# Patient Record
Sex: FEMALE | Race: WHITE | NOT HISPANIC OR LATINO | Employment: UNEMPLOYED | URBAN - METROPOLITAN AREA
[De-identification: names, ages, dates, MRNs, and addresses within clinical notes are randomized per-mention and may not be internally consistent; named-entity substitution may affect disease eponyms.]

---

## 2018-01-04 ENCOUNTER — APPOINTMENT (OUTPATIENT)
Dept: RADIOLOGY | Facility: IMAGING CENTER | Age: 58
End: 2018-01-04
Attending: PHYSICIAN ASSISTANT
Payer: COMMERCIAL

## 2018-01-04 ENCOUNTER — OFFICE VISIT (OUTPATIENT)
Dept: URGENT CARE | Facility: CLINIC | Age: 58
End: 2018-01-04
Payer: COMMERCIAL

## 2018-01-04 VITALS
TEMPERATURE: 98.3 F | RESPIRATION RATE: 16 BRPM | SYSTOLIC BLOOD PRESSURE: 110 MMHG | BODY MASS INDEX: 21.44 KG/M2 | HEIGHT: 66 IN | WEIGHT: 133.4 LBS | OXYGEN SATURATION: 97 % | DIASTOLIC BLOOD PRESSURE: 60 MMHG | HEART RATE: 72 BPM

## 2018-01-04 DIAGNOSIS — J10.1 INFLUENZA B: Primary | ICD-10-CM

## 2018-01-04 DIAGNOSIS — J06.9 URI WITH COUGH AND CONGESTION: ICD-10-CM

## 2018-01-04 LAB
FLUAV+FLUBV AG SPEC QL IA: NORMAL
INT CON NEG: NEGATIVE
INT CON POS: POSITIVE

## 2018-01-04 PROCEDURE — 87804 INFLUENZA ASSAY W/OPTIC: CPT | Performed by: PHYSICIAN ASSISTANT

## 2018-01-04 PROCEDURE — 71046 X-RAY EXAM CHEST 2 VIEWS: CPT | Mod: 26 | Performed by: PHYSICIAN ASSISTANT

## 2018-01-04 PROCEDURE — 99204 OFFICE O/P NEW MOD 45 MIN: CPT | Performed by: PHYSICIAN ASSISTANT

## 2018-01-04 RX ORDER — OSELTAMIVIR PHOSPHATE 75 MG/1
75 CAPSULE ORAL 2 TIMES DAILY
Qty: 10 CAP | Refills: 0 | Status: SHIPPED | OUTPATIENT
Start: 2018-01-04 | End: 2018-01-04 | Stop reason: SDUPTHER

## 2018-01-04 RX ORDER — METHYLPREDNISOLONE 4 MG/1
TABLET ORAL
Qty: 21 TAB | Refills: 0 | Status: SHIPPED | OUTPATIENT
Start: 2018-01-04 | End: 2019-02-04 | Stop reason: SDUPTHER

## 2018-01-04 RX ORDER — OSELTAMIVIR PHOSPHATE 75 MG/1
75 CAPSULE ORAL 2 TIMES DAILY
Qty: 10 CAP | Refills: 0 | Status: SHIPPED | OUTPATIENT
Start: 2018-01-04 | End: 2018-01-09

## 2018-01-04 RX ORDER — PROMETHAZINE HYDROCHLORIDE AND CODEINE PHOSPHATE 6.25; 1 MG/5ML; MG/5ML
5 SYRUP ORAL 4 TIMES DAILY PRN
Qty: 240 ML | Refills: 0 | Status: SHIPPED | OUTPATIENT
Start: 2018-01-04 | End: 2018-01-18

## 2018-01-04 NOTE — PATIENT INSTRUCTIONS
"Influenza Facts  Flu (influenza) is a contagious respiratory illness caused by the influenza viruses. It can cause mild to severe illness. While most healthy people recover from the flu without specific treatment and without complications, older people, young children, and people with certain health conditions are at higher risk for serious complications from the flu, including death.  CAUSES   · The flu virus is spread from person to person by respiratory droplets from coughing and sneezing.   · A person can also become infected by touching an object or surface with a virus on it and then touching their mouth, eye or nose.   · Adults may be able to infect others from 1 day before symptoms occur and up to 7 days after getting sick. So it is possible to give someone the flu even before you know you are sick and continue to infect others while you are sick.   SYMPTOMS   · Fever (usually high).   · Headache.   · Tiredness (can be extreme).   · Cough.   · Sore throat.   · Runny or stuffy nose.   · Body aches.   · Diarrhea and vomiting may also occur, particularly in children.   · These symptoms are referred to as \"flu-like symptoms\". A lot of different illnesses, including the common cold, can have similar symptoms.   DIAGNOSIS   · There are tests that can determine if you have the flu as long you are tested within the first 2 or 3 days of illness.   · A doctor's exam and additional tests may be needed to identify if you have a disease that is a complicating the flu.   RISKS AND COMPLICATIONS   Some of the complications caused by the flu include:  · Bacterial pneumonia or progressive pneumonia caused by the flu virus.   · Loss of body fluids (dehydration).   · Worsening of chronic medical conditions, such as heart failure, asthma, or diabetes.   · Sinus problems and ear infections.   HOME CARE INSTRUCTIONS   · Seek medical care early on.   · If you are at high risk from complications of the flu, consult your health-care " provider as soon as you develop flu-like symptoms. Those at high risk for complications include:   · People 65 years or older.   · People with chronic medical conditions, including diabetes.   · Pregnant women.   · Young children.   · Your caregiver may recommend use of an antiviral medication to help treat the flu.   · If you get the flu, get plenty of rest, drink a lot of liquids, and avoid using alcohol and tobacco.   · You can take over-the-counter medications to relieve the symptoms of the flu if your caregiver approves. (Never give aspirin to children or teenagers who have flu-like symptoms, particularly fever).   PREVENTION   The single best way to prevent the flu is to get a flu vaccine each fall. Other measures that can help protect against the flu are:  · Antiviral Medications   · A number of antiviral drugs are approved for use in preventing the flu. These are prescription medications, and a doctor should be consulted before they are used.   · Habits for Good Health   · Cover your nose and mouth with a tissue when you cough or sneeze, throw the tissue away after you use it.   · Wash your hands often with soap and water, especially after you cough or sneeze. If you are not near water, use an alcohol-based hand .   · Avoid people who are sick.   · If you get the flu, stay home from work or school. Avoid contact with other people so that you do not make them sick, too.   · Try not to touch your eyes, nose, or mouth as germs ore often spread this way.   IN CHILDREN, EMERGENCY WARNING SIGNS THAT NEED URGENT MEDICAL ATTENTION:  · Fast breathing or trouble breathing.   · Bluish skin color.   · Not drinking enough fluids.   · Not waking up or not interacting.   · Being so irritable that the child does not want to be held.   · Flu-like symptoms improve but then return with fever and worse cough.   · Fever with a rash.   IN ADULTS, EMERGENCY WARNING SIGNS THAT NEED URGENT MEDICAL ATTENTION:  · Difficulty  breathing or shortness of breath.   · Pain or pressure in the chest or abdomen.   · Sudden dizziness.   · Confusion.   · Severe or persistent vomiting.   SEEK IMMEDIATE MEDICAL CARE IF:   You or someone you know is experiencing any of the symptoms above. When you arrive at the emergency center,report that you think you have the flu. You may be asked to wear a mask and/or sit in a secluded area to protect others from getting sick.  MAKE SURE YOU:   · Understand these instructions.   · Monitor your condition.   · Seek medical care if you are getting worse, or not improving.   Document Released: 12/20/2004 Document Revised: 03/11/2013 Document Reviewed: 09/16/2010  [employer]® Patient Information ©2013 [employer], [employer].

## 2018-01-04 NOTE — PROGRESS NOTES
Subjective:      Pt is a 57 y.o. female who presents with Fever (x 5 days, cough, HA, chills, worse at night )            HPI  PT presents to  clinic today complaining of sore throat, fevers, chills, watery eyes, pressure in ears, cough, fatigue, runny nose. PT denies CP, SOB, NVD, abdominal pain, joint pain. PT states these symptoms began around 5 days ago. PT states the pain is a 6/10 with coughing, aching in nature and worse at night.  Pt has not taken any RX medications for this condition. The pt's medication list, problem list, and allergies have been evaluated and reviewed during today's visit.      PMH:  Negative per pt.      PSH:  Negative per pt.      Fam Hx:  the patient's family history is not pertinent to their current complaint    Soc HX:  Social History     Social History   • Marital status:      Spouse name: N/A   • Number of children: N/A   • Years of education: N/A     Occupational History   • Not on file.     Social History Main Topics   • Smoking status: Never Smoker   • Smokeless tobacco: Never Used   • Alcohol use Yes      Comment: once in a while    • Drug use: No   • Sexual activity: Not on file     Other Topics Concern   • Not on file     Social History Narrative   • No narrative on file         Medications:    Current Outpatient Prescriptions:   •  oseltamivir (TAMIFLU) 75 MG Cap, Take 1 Cap by mouth 2 times a day for 5 days., Disp: 10 Cap, Rfl: 0  •  promethazine-codeine (PHENERGAN-CODEINE) 6.25-10 MG/5ML Syrup, Take 5 mL by mouth 4 times a day as needed for up to 14 days., Disp: 240 mL, Rfl: 0  •  MethylPREDNISolone (MEDROL DOSEPAK) 4 MG Tablet Therapy Pack, Use as directed, Disp: 21 Tab, Rfl: 0      Allergies:  Patient has no known allergies.    ROS  Constitutional: Positive for chills, fevers, and malaise/fatigue.   HENT: Positive for congestion and sore throat. Negative for ear pain.    Eyes: Negative for blurred vision, double vision and photophobia.   Respiratory: Positive  "for cough and sputum production. Negative for hemoptysis, shortness of breath and wheezing.    Cardiovascular: Negative for chest pain and palpitations.   Gastrointestinal: Negative for nausea, vomiting, abdominal pain, diarrhea and constipation.   Genitourinary: Negative for dysuria and flank pain.   Musculoskeletal: Negative for falls and myalgias.   Skin: Negative for itching and rash.   Neurological:  Negative for dizziness and tingling.   Endo/Heme/Allergies: Does not bruise/bleed easily.   Psychiatric/Behavioral: Negative for depression. The patient is not nervous/anxious.             Objective:     /60   Pulse 72   Temp 36.8 °C (98.3 °F)   Resp 16   Ht 1.676 m (5' 6\")   Wt 60.5 kg (133 lb 6.4 oz)   SpO2 97%   Breastfeeding? No   BMI 21.53 kg/m²      Physical Exam       Constitutional: PT is oriented to person, place, and time. PT appears well-developed and well-nourished. No distress.   HENT:   Head: Normocephalic and atraumatic.   Right Ear: Hearing, tympanic membrane, external ear and ear canal normal.   Left Ear: Hearing, tympanic membrane, external ear and ear canal normal.   Nose: Mucosal edema, rhinorrhea and sinus tenderness present. Right sinus exhibits frontal sinus tenderness. Left sinus exhibits frontal sinus tenderness.   Mouth/Throat: Uvula is midline. Mucous membranes are pale. Posterior oropharyngeal edema and posterior oropharyngeal erythema present. No oropharyngeal exudate.   Eyes: Conjunctivae normal and EOM are normal. Pupils are equal, round, and reactive to light.   Neck: Normal range of motion. Neck supple. No thyromegaly present.   Cardiovascular: Normal rate, regular rhythm, normal heart sounds and intact distal pulses.  Exam reveals no gallop and no friction rub.    No murmur heard.  Pulmonary/Chest: Effort normal and breath sounds normal. No respiratory distress. PT has no wheezes. PT has no rales. PT exhibits no tenderness.   Abdominal: Soft. Bowel sounds are normal. " PT exhibits no distension and no mass. There is no tenderness. There is no rebound and no guarding.   Musculoskeletal: Normal range of motion. PT exhibits no edema and no tenderness.   Lymphadenopathy:     PT has no cervical adenopathy.   Neurological: PT is alert and oriented to person, place, and time. PT displays normal reflexes. No cranial nerve deficit. PT exhibits normal muscle tone. Coordination normal.   Skin: Skin is warm and dry. No rash noted. No erythema.   Psychiatric: PT has a normal mood and affect. PT behavior is normal. Judgment and thought content normal.     RADS:  Narrative     1/4/2018 12:51 PM    HISTORY/REASON FOR EXAM:  Shortness of breath    TECHNIQUE/EXAM DESCRIPTION:  PA and lateral views of the chest.    COMPARISON:  None    FINDINGS:    The heart is not enlarged. No focal consolidation, pleural effusion or pneumothorax is identified.  Costophrenic angles are sharp. Degenerative changes are seen in the spine.   Impression     No acute cardiopulmonary process is identified.      Reading Provider Reading Date   Denver Schuster M.D. Jan 4, 2018      Signing Provider Signing Date Signing Time   Denver Schuster M.D. Jan 4, 2018  1:04 PM          Assessment/Plan:     1. Influenza B    - oseltamivir (TAMIFLU) 75 MG Cap; Take 1 Cap by mouth 2 times a day for 5 days.  Dispense: 10 Cap; Refill: 0  - MethylPREDNISolone (MEDROL DOSEPAK) 4 MG Tablet Therapy Pack; Use as directed  Dispense: 21 Tab; Refill: 0    2. URI with cough and congestion    - DX-CHEST-2 VIEWS; Future  - POCT Influenza A/B-->POS B  - promethazine-codeine (PHENERGAN-CODEINE) 6.25-10 MG/5ML Syrup; Take 5 mL by mouth 4 times a day as needed for up to 14 days.  Dispense: 240 mL; Refill: 0  - MethylPREDNISolone (MEDROL DOSEPAK) 4 MG Tablet Therapy Pack; Use as directed  Dispense: 21 Tab; Refill: 0    Rest, fluids encouraged.  OTC decongestant for congestion/cough  AVS with medical info given.  Pt was in full understanding and  agreement with the plan.  Follow-up as needed if symptoms worsen or fail to improve.

## 2018-05-04 ENCOUNTER — TELEPHONE (OUTPATIENT)
Dept: MEDICAL GROUP | Facility: LAB | Age: 58
End: 2018-05-04

## 2018-05-04 NOTE — TELEPHONE ENCOUNTER
Phone Number Called: [phone] (home)     Message: I left a voicemail that Dr. Jorge will be on personal leave in May, we rescheduled your appointment from 5/16 to Wednesday 6/20 at 9am. Please contact us to reschedule your appointment ([phone] [facility] Scheduling or [employer]) with Dr. Jorge or another provider in the office. We are sorry for the short notice and any inconvenience. Thank you!    Left Message for patient to call back: yes

## 2018-06-20 ENCOUNTER — OFFICE VISIT (OUTPATIENT)
Dept: MEDICAL GROUP | Facility: LAB | Age: 58
End: 2018-06-20
Payer: COMMERCIAL

## 2018-06-20 VITALS
OXYGEN SATURATION: 96 % | HEIGHT: 66 IN | SYSTOLIC BLOOD PRESSURE: 114 MMHG | RESPIRATION RATE: 12 BRPM | BODY MASS INDEX: 22.25 KG/M2 | HEART RATE: 60 BPM | TEMPERATURE: 98.5 F | DIASTOLIC BLOOD PRESSURE: 68 MMHG | WEIGHT: 138.45 LBS

## 2018-06-20 DIAGNOSIS — Z12.11 COLON CANCER SCREENING: ICD-10-CM

## 2018-06-20 DIAGNOSIS — Z78.0 MENOPAUSE: ICD-10-CM

## 2018-06-20 DIAGNOSIS — Z23 NEED FOR VACCINATION: ICD-10-CM

## 2018-06-20 DIAGNOSIS — Z85.828 HISTORY OF BASAL CELL CANCER: ICD-10-CM

## 2018-06-20 DIAGNOSIS — Z00.00 ANNUAL PHYSICAL EXAM: ICD-10-CM

## 2018-06-20 PROCEDURE — 99203 OFFICE O/P NEW LOW 30 MIN: CPT | Performed by: INTERNAL MEDICINE

## 2018-06-20 ASSESSMENT — PATIENT HEALTH QUESTIONNAIRE - PHQ9: CLINICAL INTERPRETATION OF PHQ2 SCORE: 0

## 2018-06-20 ASSESSMENT — PAIN SCALES - GENERAL: PAINLEVEL: NO PAIN

## 2018-06-20 NOTE — PROGRESS NOTES
Chief Complaint   Patient presents with   • Referral Needed     dermatology, establish care        Subjective:     History of Present Illness: Patient is a 58 y.o. female. This pleasant patient is here today to establish care with a new primary care provider and address medical problems listed below.    Annual physical exam  This is a pleasant 58-year-old lady who is here today to establish care with a new primary care provider.  She is fairly healthy, she tries to eat healthy and she exercises on a regular basis.  She is a never smoker.  We discussed about her health maintenance as below.  She normally sees her gynecologist Dr. Gaines on a regular basis for her woman's health exam as well as Pap smears.  Mammograms: Scheduled for next week. Last was couple of years, usually normal.  PAP smears: 2 wks ago by Dr. Gaines  Colon cancer screening: Colonoscopy done, 5 yrs interval  Osteoporosis screening: Ordered DEXA scan, average risk  Immunizations: Tdap today    History of basal cell cancer  New to me, chronic controlled.  She stated that she has a history of a basal cell carcinoma that was resected from her nose area in 2015.  Stayed in remission.  She had a history of moderate sun exposure as she grew up in [facility] without use of sunscreen.  Her sister was recently diagnosed with malignant melanoma and got resected from her leg area.  She is currently in remission.  She is quite concerned that she would like to establish with a dermatologist for annual skin surveillance.      Allergies: Patient has no known allergies.    Current Outpatient Prescriptions Ordered in [facility]   Medication Sig Dispense Refill   • MethylPREDNISolone (MEDROL DOSEPAK) 4 MG Tablet Therapy Pack Use as directed 21 Tab 0     No current Epic-ordered facility-administered medications on file.        Past Medical History:   Diagnosis Date   • History of basal cell cancer 6/20/2018    S/P resection from nose 2015 H/O Moderate sun  "exposure FHx of melanoma       Past Surgical History:   Procedure Laterality Date   • BASAL CELL EXCISION         Social History   Substance Use Topics   • Smoking status: Never Smoker   • Smokeless tobacco: Never Used   • Alcohol use Yes      Comment: once in a while        Family History   Problem Relation Age of Onset   • Other Mother      Primary billiary chirosis    • Cancer Father      AML   • Cancer Sister      Malignant Melanoma    • No Known Problems Brother        ROS:     - Constitutional: Negative for fever, chills, unexpected weight change, and fatigue/generalized weakness.     - HEENT: Negative for headaches, vision changes, hearing changes, ear pain, ear discharge, sinus congestion, sore throat, and neck pain.      - Respiratory: Negative for cough, sputum production, dyspnea and wheezing.    - Cardiovascular: Negative for chest pain, palpitations, orthopnea, and bilateral lower extremity edema.     - Gastrointestinal: Negative for heartburn, nausea, vomiting, abdominal pain, hematochezia, melena, diarrhea, constipation, and greasy/foul-smelling stools.     - Genitourinary: Negative for dysuria, polyuria, hematuria, pyuria, urinary urgency, and urinary incontinence.    - Musculoskeletal: Negative for myalgias, back pain, and joint pain.     - Skin: Negative for rash, itching, cyanotic skin color change.     - Neurological: Negative for dizziness, tingling, tremors, focal sensory deficit, focal weakness and headaches.     - Endo/Heme/Allergies: Does not bruise/bleed easily.     - Psychiatric/Behavioral: Negative for depression, suicidal/homicidal ideation and memory loss.        - NOTE: All other systems reviewed and are negative, except as in HPI.       Physical Exam:     Blood pressure 114/68, pulse 60, temperature 36.9 °C (98.5 °F), resp. rate 12, height 1.664 m (5' 5.5\"), weight 62.8 kg (138 lb 7.2 oz), SpO2 96 %, not currently breastfeeding. Body mass index is 22.69 kg/m².   General: Normal " appearing. No distress.  HEENT: Normocephalic. Eyes conjunctiva clear lids without ptosis, pupils equal and reactive to light accommodation, ears normal shape and contour, canals are clear bilaterally, tympanic membranes are benign,  oropharynx is without erythema, edema or exudates.    Neck: Supple without JVD or bruit. Thyroid is not enlarged.  Pulmonary: Clear to ausculation.  Normal effort. No rales, ronchi, or wheezing.  Cardiovascular: Regular rate and rhythm without murmur. Radial pulses are intact, regular and symmetrical bilaterally.  Abdomen: Soft, nontender, nondistended. Normal bowel sounds. Liver and spleen are not palpable.  Neurologic: Grossly non-focal.  Lymph: No cervical, occipital or supraclavicular lymph nodes are palpable  Skin: Warm and dry.  No obvious lesions.  Musculoskeletal: Normal gait. No extremity cyanosis, clubbing, or edema.  Psych: Normal mood and affect. Alert and oriented x3. Judgment and insight is normal.    Data:     Requested outside records for Pap smear and colonoscopy.    Assessment and Plan:     1. History of basal cell cancer  New to me, chronic controlled.  No new suspicious lesions. Referred to dermatology for regular skin surveillance.   - REFERRAL TO DERMATOLOGY    2. Colon cancer screening  Due for repeat, will obtain old records. Proceed with referral.   - REFERRAL TO GI FOR COLONOSCOPY      3. Annual physical exam  As discussed in HPI, she is up-to-date on her health maintenance otherwise.  We need to update her immunizations for next visit.  We will proceed with the following labs for screening purposes well.  - HEMOGLOBIN A1C; Future  - LIPID PROFILE; Future  - VITAMIN D,25 HYDROXY; Future  - TSH; Future  - COMP METABOLIC PANEL; Future  - CBC WITHOUT DIFFERENTIAL; Future    4. Need for vaccination  She needs hepatitis A, B and TD vaccines.  And available in the office today, will be given next time.  - HEPATITIS B VACCINE ADULT IM  - HEPATITIS A VACCINE ADULT  IM    5. Menopause  This patient is average risk for osteoporosis, will proceed with a baseline bone density scan.  - DS-BONE DENSITY STUDY (DEXA); Future      Health Maintenance:      Completed  Health Maintenance Due   Topic   • IMM DTaP/Tdap/Td Vaccine (1 - Tdap)   • PAP SMEAR    • MAMMOGRAM    • COLONOSCOPY        Follow Up:      Return in about 2 months (around 8/20/2018) for FU labs and DEXA, more immunizations.    Please note that this dictation was created using voice recognition software. I have made every reasonable attempt to correct obvious errors, but I expect that there are errors of grammar and possibly content that I did not discover before finalizing the note.    Signed by: Izzy Jorge M.D.

## 2018-06-20 NOTE — ASSESSMENT & PLAN NOTE
New to me, chronic controlled.  She stated that she has a history of a basal cell carcinoma that was resected from her nose area in 2015.  Stayed in remission.  She had a history of moderate sun exposure as she grew up in [facility] without use of sunscreen.  Her sister was recently diagnosed with malignant melanoma and got resected from her leg area.  She is currently in remission.  She is quite concerned that she would like to establish with a dermatologist for annual skin surveillance.

## 2018-06-20 NOTE — ASSESSMENT & PLAN NOTE
This is a pleasant 58-year-old lady who is here today to establish care with a new primary care provider.  She is fairly healthy, she tries to eat healthy and she exercises on a regular basis.  She is a never smoker.  We discussed about her health maintenance as below.  She normally sees her gynecologist Dr. Gaines on a regular basis for her woman's health exam as well as Pap smears.  Mammograms: Scheduled for next week. Last was couple of years, usually normal.  PAP smears: 2 wks ago by Dr. Gaines  Colon cancer screening: Colonoscopy done, 5 yrs interval  Osteoporosis screening: Ordered DEXA scan, average risk  Immunizations: Tdap today

## 2018-06-22 DIAGNOSIS — Z12.11 COLON CANCER SCREENING: ICD-10-CM

## 2018-06-27 ENCOUNTER — OFFICE VISIT (OUTPATIENT)
Dept: DERMATOLOGY | Facility: IMAGING CENTER | Age: 58
End: 2018-06-27
Payer: COMMERCIAL

## 2018-06-27 VITALS — TEMPERATURE: 97.7 F | WEIGHT: 136 LBS | HEIGHT: 66 IN | BODY MASS INDEX: 21.86 KG/M2

## 2018-06-27 DIAGNOSIS — L57.0 ACTINIC KERATOSES: ICD-10-CM

## 2018-06-27 DIAGNOSIS — D23.9 ANGIOKERATOMA: ICD-10-CM

## 2018-06-27 DIAGNOSIS — L30.9 ECZEMA, UNSPECIFIED TYPE: ICD-10-CM

## 2018-06-27 DIAGNOSIS — Z85.828 HISTORY OF BASAL CELL CARCINOMA: ICD-10-CM

## 2018-06-27 DIAGNOSIS — L73.8 SEBACEOUS HYPERPLASIA: ICD-10-CM

## 2018-06-27 DIAGNOSIS — D23.9 DERMATOFIBROMA: ICD-10-CM

## 2018-06-27 PROCEDURE — 17003 DESTRUCT PREMALG LES 2-14: CPT | Performed by: DERMATOLOGY

## 2018-06-27 PROCEDURE — 17000 DESTRUCT PREMALG LESION: CPT | Performed by: DERMATOLOGY

## 2018-06-27 PROCEDURE — 99203 OFFICE O/P NEW LOW 30 MIN: CPT | Mod: 25 | Performed by: DERMATOLOGY

## 2018-06-27 RX ORDER — TRIAMCINOLONE ACETONIDE 1 MG/G
1 CREAM TOPICAL 2 TIMES DAILY
Qty: 30 G | Refills: 1 | Status: SHIPPED | OUTPATIENT
Start: 2018-06-27 | End: 2019-02-04 | Stop reason: SDUPTHER

## 2018-06-27 ASSESSMENT — ENCOUNTER SYMPTOMS
CHILLS: 0
FEVER: 0

## 2018-06-27 NOTE — PROGRESS NOTES
Dermatology New Patient Visit    Chief Complaint   Patient presents with   • Establish Care       Subjective:     HPI:   Yesenia Dey is a 58 y.o. female presenting for    Skin cancer screening today   Last exam 5 years ago   Area of concern : lesions on right hand x 5 years on and off  No recent change in size  No itching/bleeding/pain  No treatments  Flakes off intermittently     Brown spots on the arms  Increasing in number   No recent change in size  No itching/bleeding/pain  No treatments    Dry skin behind the ears  Bothersome intermittently   Itchy  Uses OTC moisturizing cream, no improvement     History of skin cancer: Yes, Details:  Basal on tip of nose 10/2016   History of biopsies:No  History of blistering/severe sunburns:Yes, Details:  During youth  Family history of skin cancer:Yes, Details: sister, melanoma   Family history of atypical moles:No        Past Medical History:   Diagnosis Date   • History of basal cell cancer 6/20/2018    S/P resection from nose 2015 H/O Moderate sun exposure FHx of melanoma       Current Outpatient Prescriptions on File Prior to Visit   Medication Sig Dispense Refill   • MethylPREDNISolone (MEDROL DOSEPAK) 4 MG Tablet Therapy Pack Use as directed 21 Tab 0     No current facility-administered medications on file prior to visit.        No Known Allergies    Family History   Problem Relation Age of Onset   • Other Mother      Primary billiary chirosis    • Cancer Father      AML   • Cancer Sister      Malignant Melanoma    • No Known Problems Brother        Social History     Social History   • Marital status:      Spouse name: N/A   • Number of children: N/A   • Years of education: N/A     Occupational History   • Not on file.     Social History Main Topics   • Smoking status: Never Smoker   • Smokeless tobacco: Never Used   • Alcohol use Yes      Comment: once in a while    • Drug use: No   • Sexual activity: Yes     Partners: Male     Other Topics Concern   •  "Not on file     Social History Narrative   • No narrative on file       Review of Systems   Constitutional: Negative for chills and fever.   Skin: Positive for itching and rash.   All other systems reviewed and are negative.       Objective:     A full mucocutaneous exam was completed including: scalp, hair, ears, face, eyelids, conjunctiva, lips, gums/tongue/oropharynx, neck, chest breasts, abdomen, back, left and right upper extremities (including hands/digits and fingernails), left and right lower extremities (including feet/toes, toenails), buttocks, including external genitalia (patient refusal) with the following pertinent findings listed below. Remaining above-listed examined areas within normal limits / negative for rashes or lesions.    Temperature 36.5 °C (97.7 °F), height 1.664 m (5' 5.5\"), weight 61.7 kg (136 lb).    Physical Exam   Constitutional: She is oriented to person, place, and time and well-developed, well-nourished, and in no distress.   HENT:   Head: Normocephalic and atraumatic.       Right Ear: External ear normal.   Left Ear: External ear normal.   Nose: Nose normal.   Mouth/Throat: Oropharynx is clear and moist.   Eyes: Conjunctivae and lids are normal.   Neck: Normal range of motion. Neck supple.   Cardiovascular: Intact distal pulses.    Pulmonary/Chest: Effort normal.   Neurological: She is alert and oriented to person, place, and time.   Skin: Skin is warm and dry.        Psychiatric: Mood and affect normal.   Vitals reviewed.      DATA: none applicable to review    Assessment and Plan:     1. Actinic keratoses  CRYOTHERAPY:  Risks (including, but not limited to: hypo or hyperpigmentation, redness, blister, blood blister, recurrence, need for further treatment, infection, scar) and benefits of cryotherapy discussed. Patient verbally agreed to proceed with treatment. 2 cryotherapy freeze thaw cycles of 10 seconds were applied to 3 lesions on the hands with cryac. Patient tolerated " procedure well. Aftercare instructions given.    2. History of basal cell carcinoma  Skin cancer education  - discussed importance of sun protective clothing, eyewear  - discussed importance of daily use of broad spectrum sunscreen with SPF 30 or greater, as well as need for reapplication ~every 2 hours when exposed to UVR  - discussed importance of regular self-exams, ideally once per month, every 12 months exams in clinic  - ABCDE's of melanoma discussed  - patient to bring any new or concerning lesions to my attention    3. Eczema, unspecified type  - start triamcinolone 0.1% ointment to affected area of the body twice daily. Patient instructed to avoid face, axilla, and groin area. Side effects discussed, including skin thinning, appearance of stretch marks (striae), easy bruising, telangiectasias, and possible increased hair growth     4. Sebaceous hyperplasia  - Benign-appearing nature of lesions discussed. Advised to return to clinic for any new or concerning changes.    5. Dermatofibroma  - Benign-appearing nature of lesion discussed. Advised to return to clinic for any new or concerning changes.    6. Angiokeratomas  - Benign-appearing nature of lesions discussed. Advised to return to clinic for any new or concerning changes.    Followup: Return in about 1 year (around 6/27/2019) for tommy, sooner if AK no improvement.    Manju Pelaez M.D.

## 2018-09-11 ENCOUNTER — OFFICE VISIT (OUTPATIENT)
Dept: DERMATOLOGY | Facility: IMAGING CENTER | Age: 58
End: 2018-09-11
Payer: COMMERCIAL

## 2018-09-11 DIAGNOSIS — L57.0 ACTINIC KERATOSES: ICD-10-CM

## 2018-09-11 PROCEDURE — 17003 DESTRUCT PREMALG LES 2-14: CPT | Performed by: DERMATOLOGY

## 2018-09-11 PROCEDURE — 17000 DESTRUCT PREMALG LESION: CPT | Performed by: DERMATOLOGY

## 2018-09-11 ASSESSMENT — ENCOUNTER SYMPTOMS
FEVER: 0
CHILLS: 0

## 2018-09-11 NOTE — PROGRESS NOTES
Dermatology Return Patient Visit    Chief Complaint   Patient presents with   • Skin Lesion       Subjective:     HPI:   Yesenia Dey is a 58 y.o. female presenting for    Spot on nose that bleed after washing face.  concerned since history of BCC.    HPI/location: bleeding spot on nose  Time present: 10+ days ago  Painful lesion: No  Itching lesion: No  Enlarging lesion: No  Anything make it better or worse? Washing face caused bleeding    Spot on the left dorsal hand  Came back after cryotherapy  Rough, no itching/bleeding      History of skin cancer: Yes, Details:  Basal on tip of nose 10/2016   History of biopsies:No  History of blistering/severe sunburns:Yes, Details:  During youth  Family history of skin cancer:Yes, Details: sister, melanoma   Family history of atypical moles:No        Past Medical History:   Diagnosis Date   • History of basal cell cancer 6/20/2018    S/P resection from nose 2015 H/O Moderate sun exposure FHx of melanoma       Current Outpatient Prescriptions on File Prior to Visit   Medication Sig Dispense Refill   • triamcinolone acetonide (KENALOG) 0.1 % Cream Apply 1 Application to affected area(s) 2 times a day. Until improved 30 g 1   • MethylPREDNISolone (MEDROL DOSEPAK) 4 MG Tablet Therapy Pack Use as directed 21 Tab 0     No current facility-administered medications on file prior to visit.        No Known Allergies    Review of Systems   Constitutional: Negative for chills and fever.   Skin: Positive for itching and rash.   All other systems reviewed and are negative.       Objective:     A focused cutaneous exam was completed including: face, eyelids, conjunctiva, lips,  Neck, left and right upper extremities (including hands/digits and fingernails) with the following pertinent findings listed below. Remaining above-listed examined areas within normal limits / negative for rashes or lesions.    There were no vitals taken for this visit.    Physical Exam   Constitutional: She is  oriented to person, place, and time and well-developed, well-nourished, and in no distress.   HENT:   Head: Normocephalic and atraumatic.       Nose: Nose normal.   Eyes: Conjunctivae and lids are normal.   Neck: Normal range of motion.   Pulmonary/Chest: Effort normal.   Neurological: She is alert and oriented to person, place, and time.   Skin: Skin is warm and dry.        Psychiatric: Mood and affect normal.       DATA: none applicable to review    Assessment and Plan:     1. Actinic keratoses - nose, hand  CRYOTHERAPY:  Risks (including, but not limited to: hypo or hyperpigmentation, redness, blister, blood blister, recurrence, need for further treatment, infection, scar) and benefits of cryotherapy discussed. Patient verbally agreed to proceed with treatment. 2 cryotherapy freeze thaw cycles of 10 seconds were applied to 2 lesions on face, hand with cryac. Patient tolerated procedure well. Aftercare instructions given.  - if no improvement next visit, bx    Followup: Return for spot check, oct 2018.    Manju Pelaez M.D.

## 2018-10-31 ENCOUNTER — OFFICE VISIT (OUTPATIENT)
Dept: DERMATOLOGY | Facility: IMAGING CENTER | Age: 58
End: 2018-10-31
Payer: COMMERCIAL

## 2018-10-31 DIAGNOSIS — L73.8 SEBACEOUS HYPERPLASIA: ICD-10-CM

## 2018-10-31 DIAGNOSIS — Z85.828 HISTORY OF BASAL CELL CARCINOMA: ICD-10-CM

## 2018-10-31 DIAGNOSIS — Z87.2 HISTORY OF ACTINIC KERATOSES: ICD-10-CM

## 2018-10-31 PROCEDURE — 99212 OFFICE O/P EST SF 10 MIN: CPT | Performed by: DERMATOLOGY

## 2018-10-31 ASSESSMENT — ENCOUNTER SYMPTOMS
FEVER: 0
CHILLS: 0

## 2018-10-31 NOTE — PROGRESS NOTES
Dermatology Return Patient Visit    Chief Complaint   Patient presents with   • Actinic Keratosis       Subjective:     HPI:   Yesenia Dey is a 58 y.o. female presenting for    Follow up [place] on nose   S/p treatment with cryo, improved    HPI/location: little bumps on nose, cheek  Time present: increasing over the last 1+ eyar  Painful lesion: No  Itching lesion: No  Enlarging lesion: unknown  Anything make it better or worse? n/a    History of skin cancer: Yes, Details:  Basal on tip of nose 10/2016   History of biopsies:No  History of blistering/severe sunburns:Yes, Details:  During youth  Family history of skin cancer:Yes, Details: sister, melanoma   Family history of atypical moles:No        Past Medical History:   Diagnosis Date   • History of basal cell cancer 6/20/2018    S/P resection from nose 2015 H/O Moderate sun exposure FHx of melanoma       Current Outpatient Prescriptions on File Prior to Visit   Medication Sig Dispense Refill   • triamcinolone acetonide (KENALOG) 0.1 % Cream Apply 1 Application to affected area(s) 2 times a day. Until improved 30 g 1   • MethylPREDNISolone (MEDROL DOSEPAK) 4 MG Tablet Therapy Pack Use as directed 21 Tab 0     No current facility-administered medications on file prior to visit.        No Known Allergies    Review of Systems   Constitutional: Negative for chills and fever.   Skin: Positive for itching and rash.   All other systems reviewed and are negative.       Objective:     A focused cutaneous exam was completed including: face, eyelids, conjunctiva, lips,  Neck, left and right upper extremities (including hands/digits and fingernails) with the following pertinent findings listed below. Remaining above-listed examined areas within normal limits / negative for rashes or lesions.    Physical Exam   Constitutional: She is oriented to person, place, and time and well-developed, well-nourished, and in no distress.   HENT:   Head: Normocephalic and atraumatic.        Nose: Nose normal.   Eyes: Conjunctivae and lids are normal.   Neck: Normal range of motion.   Pulmonary/Chest: Effort normal.   Neurological: She is alert and oriented to person, place, and time.   Skin: Skin is warm and dry.        Psychiatric: Mood and affect normal.       DATA: none applicable to review    Assessment and Plan:     1. History of actinic keratoses  - s/p cryo, well healed  - consider efudex in the future if needed (field treatment to nose)    2. History of basal cell carcinoma  Skin cancer education  - discussed importance of sun protective clothing, eyewear  - discussed importance of daily use of broad spectrum sunscreen with SPF 30 or greater, as well as need for reapplication ~every 2 hours when exposed to UVR  - discussed importance of regular self-exams, ideally once per month, every 6-12 months exams in clinic  - ABCDE's of melanoma discussed  - patient to bring any new or concerning lesions to my attention    3. Sebaceous hyperplasia  - Benign-appearing nature of lesions discussed. Advised to return to clinic for any new or concerning changes.  - osmetic treatment options discussed    Followup: Return for f/u possible efudex feb/march 2019.    Manju Pelaez M.D.

## 2019-02-04 ENCOUNTER — PATIENT MESSAGE (OUTPATIENT)
Dept: DERMATOLOGY | Facility: IMAGING CENTER | Age: 59
End: 2019-02-04

## 2019-02-04 DIAGNOSIS — J06.9 URI WITH COUGH AND CONGESTION: ICD-10-CM

## 2019-02-04 DIAGNOSIS — J10.1 INFLUENZA B: ICD-10-CM

## 2019-02-04 RX ORDER — METHYLPREDNISOLONE 4 MG/1
TABLET ORAL
Qty: 21 TAB | Refills: 0 | Status: SHIPPED | OUTPATIENT
Start: 2019-02-04 | End: 2019-02-14

## 2019-02-04 RX ORDER — TRIAMCINOLONE ACETONIDE 1 MG/G
1 CREAM TOPICAL 2 TIMES DAILY
Qty: 30 G | Refills: 1 | Status: SHIPPED | OUTPATIENT
Start: 2019-02-04 | End: 2020-02-05

## 2019-02-04 NOTE — TELEPHONE ENCOUNTER
----- Message from Yesenia Dey sent at 2/4/2019  9:25 AM PST -----  Regarding: Prescription Question  Contact: [phone]  Dr. Pelaez, I am out of town and have developed this rash on my hands.  it started about a week ago. It was red and itchy. I started applying 1 percent hydrocortisone several times a day, and continued to do so.   On Friday Jan 30th it became swollen and very red, still itchy, and sometimes seemed to develop a slight bumpiness.   On Rob feb 3  I took 10 mg of loratadinein the morning.   That afternoon I developed 2 bums on my jaw under the skin -about where  I could put my thumb and forefinger to hold my lower jaw. I'll attach a photo touching those spots. Also my lips seem a little swollen on the outer edges. where the swelling is there seems to be a few small bumps  On Rob feb 3 I took 50 mg of diphenhydramine when I went to bed and 25 mg more at 3 am  There has been little if any improvement since I began taking the antihistamines - which was only yesterday. I seem to get some relief from itching on my hands with the hydrocortisone cream. There is no itching in my face- lips or jaw. I do not know if they are related.   Can you recommend anything I can do to help with this?  Steroid dosepak? Thank you   Yesenia Dey

## 2019-02-04 NOTE — TELEPHONE ENCOUNTER
Regarding: FW: Prescription Question  Lety -    Can you call Yesenia and let her know I will prescribe the triamcinolone 0.1% cream to use twice a day to the areas on the skin (face and hands) for 7-10 days. I would recommend that she take zyrtec (or generic cetirizine 20mg at bedtime every night for a week as well. I will send an RX for the medrol dose pack to have on hand if above does not help as well. I just need to know what pharmacy I should send the prescriptions to (since she is in [place])?    Thanks!  ----- Message -----  From: Zeferino Huang Ass't  Sent: 2/4/2019   9:32 AM  To: Manju Pelaez M.D.  Subject: Prescription Question                            ----- Message from Zeferino Huang Ass't sent at 2/4/2019  9:32 AM PST -----       ----- Message from Yesenia Dey to Manju Pelaez M.D. sent at 2/4/2019  9:25 AM -----   Dr. Pelaez, I am out of town and have developed this rash on my hands.  it started about a week ago. It was red and itchy. I started applying 1 percent hydrocortisone several times a day, and continued to do so.   On Friday Jan 30th it became swollen and very red, still itchy, and sometimes seemed to develop a slight bumpiness.   On Rob feb 3  I took 10 mg of loratadinein the morning.   That afternoon I developed 2 bums on my jaw under the skin -about where  I could put my thumb and forefinger to hold my lower jaw. I'll attach a photo touching those spots. Also my lips seem a little swollen on the outer edges. where the swelling is there seems to be a few small bumps  On Rob feb 3 I took 50 mg of diphenhydramine when I went to bed and 25 mg more at 3 am  There has been little if any improvement since I began taking the antihistamines - which was only yesterday. I seem to get some relief from itching on my hands with the hydrocortisone cream. There is no itching in my face- lips or jaw. I do not know if they are related.   Can you recommend anything I  can do to help with this?  Steroid dosepak? Thank you   Yesenia Dey

## 2019-02-14 ENCOUNTER — OFFICE VISIT (OUTPATIENT)
Dept: DERMATOLOGY | Facility: IMAGING CENTER | Age: 59
End: 2019-02-14
Payer: COMMERCIAL

## 2019-02-14 DIAGNOSIS — L98.9 DISORDER OF THE SKIN AND SUBCUTANEOUS TISSUE, UNSPECIFIED: ICD-10-CM

## 2019-02-14 PROCEDURE — 99213 OFFICE O/P EST LOW 20 MIN: CPT | Performed by: DERMATOLOGY

## 2019-02-14 RX ORDER — PREDNISONE 10 MG/1
TABLET ORAL
Qty: 30 TAB | Refills: 0 | Status: SHIPPED | OUTPATIENT
Start: 2019-02-14 | End: 2020-02-05

## 2019-02-14 ASSESSMENT — ENCOUNTER SYMPTOMS
CHILLS: 0
FEVER: 0

## 2019-02-14 NOTE — PROGRESS NOTES
Dermatology Return Patient Visit    Chief Complaint   Patient presents with   • Follow-Up       Subjective:     HPI:   Yesenia Dey is a 58 y.o. female presenting for    Hands and lips are swelling.    HPI: rash and swelling, hands and lips (around mouth)  Onset: 1/31/19  Symptoms: red, swelling, hands itch tremendously  Aggravating factors: none  Alleviating factors: Medrol dose pack, but started happening again on last day of taper  New creams/topicals: n/a  New medications (up to last 6 months): n/a  New travel: Was recently in [place], [place], ate some fried shrimp, which was the only real new food/exposure -- used all of her own soaps, etc  Other exposures: new cleaning products used in [place] home  Treatments: Triamcinolone, zyrtec qd, benadryl qhs, Claritin qd.  Symptoms were improving with medrol dose pack up until last dose, symptoms returned.   Has had h/o vobella in the lips, juvaderm in face, kybella in neck, restylene lift in hands (all over several years, but hands were just last September, 2018)    History of skin cancer: Yes, Details:  Basal on tip of nose 10/2016   History of biopsies:No  History of blistering/severe sunburns:Yes, Details:  During youth  Family history of skin cancer:Yes, Details: sister, melanoma   Family history of atypical moles:No        Past Medical History:   Diagnosis Date   • History of basal cell cancer 6/20/2018    S/P resection from nose 2015 H/O Moderate sun exposure FHx of melanoma       Current Outpatient Prescriptions on File Prior to Visit   Medication Sig Dispense Refill   • triamcinolone acetonide (KENALOG) 0.1 % Cream Apply 1 Application to affected area(s) 2 times a day. Until improved 30 g 1   • MethylPREDNISolone (MEDROL DOSEPAK) 4 MG Tablet Therapy Pack Use as directed (Patient not taking: Reported on 2/14/2019) 21 Tab 0     No current facility-administered medications on file prior to visit.        No Known Allergies    Review of Systems    Constitutional: Negative for chills and fever.   Skin: Positive for itching and rash.   All other systems reviewed and are negative.       Objective:     A focused cutaneous exam was completed including: face, eyelids, conjunctiva, lips,  Neck, left and right upper extremities (including hands/digits and fingernails) with the following pertinent findings listed below. Remaining above-listed examined areas within normal limits / negative for rashes or lesions.    Physical Exam   Constitutional: She is oriented to person, place, and time and well-developed, well-nourished, and in no distress.   HENT:   Head: Normocephalic and atraumatic.       Nose: Nose normal.   Eyes: Conjunctivae and lids are normal.   Neck: Normal range of motion.   Pulmonary/Chest: Effort normal.   Neurological: She is alert and oriented to person, place, and time.   Skin: Skin is warm and dry.        Psychiatric: Mood and affect normal.       DATA: none applicable to review    Assessment and Plan:     1. Disorder of the skin and subcutaneous tissue, unspecified  Comment: ddx allergic contact dermatitis - ?exposure on travel vs delayed granulomatous reaction to filler (well-known with vobella, but not as many reports with restylane; also would be unusual with two different areas/two different products) vs other/angioedema  - educated patient about possible diagnosis, management options, and expectations of treatment  - restart prednisone taper, over longer period of time - 20mg daily x 1 week, 10mg daily x 1 week, 5 mg daily x 1 week  - consider abx pending response; additionally may recommend hyaluronidase    Followup: Return in about 4 weeks (around 3/14/2019) for f/u rash, ?[place] field treatment likely in fall.    Manju Pelaez M.D.

## 2019-03-07 ENCOUNTER — OFFICE VISIT (OUTPATIENT)
Dept: DERMATOLOGY | Facility: IMAGING CENTER | Age: 59
End: 2019-03-07
Payer: COMMERCIAL

## 2019-03-07 DIAGNOSIS — L98.9 DISORDER OF THE SKIN AND SUBCUTANEOUS TISSUE, UNSPECIFIED: ICD-10-CM

## 2019-03-07 PROCEDURE — 99212 OFFICE O/P EST SF 10 MIN: CPT | Performed by: DERMATOLOGY

## 2019-03-07 ASSESSMENT — ENCOUNTER SYMPTOMS
FEVER: 0
CHILLS: 0

## 2019-03-07 NOTE — PROGRESS NOTES
"Dermatology Return Patient Visit    Chief Complaint   Patient presents with   • Dermatitis       Subjective:     HPI:   Yesenia Dey is a 58 y.o. female presenting for    Follow up rash on hands, around mouth  Improved today  Little \"bumps\" on the chin seem smaller  Hand swelling is down, but there is still some redness  On 5mg prednisone daily, has a few tablets left  Taking Claritin, zyrtec, benadryl  Minimal itching left  No new concerns    History:  Onset: 1/31/19  Symptoms: red, swelling, hands - tremendous itching  New creams/topicals: n/a  New medications (up to last 6 months): n/a  New travel: Was recently in [place], [place], ate some fried shrimp, which was the only real new food/exposure -- used all of her own soaps, etc  Other exposures: new cleaning products used in [place] home  Prior treatments: Triamcinolone, zyrtec qd, benadryl qhs, Claritin qd; Medrol dose pack, but recurred again on last day of taper  Symptoms were improving with medrol dose pack up until last dose, symptoms returned.   Has had h/o vobella in the lips, juvaderm in face, kybella in neck, restylene lift in hands (all over several years, but hands were just last September, 2018)    History of skin cancer: Yes, Details:  Basal on tip of nose 10/2016   History of biopsies:No  History of blistering/severe sunburns:Yes, Details:  During youth  Family history of skin cancer:Yes, Details: sister, melanoma   Family history of atypical moles:No        Past Medical History:   Diagnosis Date   • History of basal cell cancer 6/20/2018    S/P resection from nose 2015 H/O Moderate sun exposure FHx of melanoma       Current Outpatient Prescriptions on File Prior to Visit   Medication Sig Dispense Refill   • predniSONE (DELTASONE) 10 MG Tab 2 tablets daily x 7 days, 1 tablet daily x 7 days, 1/2 tablet daily x 7 days 30 Tab 0   • triamcinolone acetonide (KENALOG) 0.1 % Cream Apply 1 Application to affected area(s) 2 times a day. Until " improved 30 g 1     No current facility-administered medications on file prior to visit.        No Known Allergies    Review of Systems   Constitutional: Negative for chills and fever.   Skin: Positive for itching and rash.   All other systems reviewed and are negative.       Objective:     A focused cutaneous exam was completed including: face, eyelids, conjunctiva, lips,  Neck, left and right upper extremities (including hands/digits and fingernails) with the following pertinent findings listed below. Remaining above-listed examined areas within normal limits / negative for rashes or lesions.    Physical Exam   Constitutional: She is oriented to person, place, and time and well-developed, well-nourished, and in no distress.   HENT:   Head: Normocephalic and atraumatic.       Nose: Nose normal.   Eyes: Conjunctivae and lids are normal.   Neck: Normal range of motion.   Pulmonary/Chest: Effort normal.   Neurological: She is alert and oriented to person, place, and time.   Skin: Skin is warm and dry.        Psychiatric: Mood and affect normal.       DATA: none applicable to review    Assessment and Plan:     1. Disorder of the skin and subcutaneous tissue, unspecified  Comment: ddx allergic contact dermatitis - considering delayed allergic/granulomatous reaction to filler (well-known with vobella, but not as many reports with restylane; also would be unusual with two different areas/two different products) vs ?exposure on travel vs other/angioedema; improved today after longer steroid taper  - finish remainder of prednisone taper  - re-discussed suspected diagnosis, management options, and expectations of treatment  - if recurs, consider abx; we additionally discussed possible hyaluronidase (from her filler office) if continues to recur  - hold off on additional blood work for now    Followup: Return if symptoms worsen or fail to improve, BERNIE summer (h/o BCC).    Manju Pelaez M.D.

## 2019-12-17 ENCOUNTER — OFFICE VISIT (OUTPATIENT)
Dept: MEDICAL GROUP | Facility: LAB | Age: 59
End: 2019-12-17
Payer: COMMERCIAL

## 2019-12-17 VITALS
SYSTOLIC BLOOD PRESSURE: 110 MMHG | BODY MASS INDEX: 21.12 KG/M2 | OXYGEN SATURATION: 95 % | HEART RATE: 65 BPM | TEMPERATURE: 97.6 F | DIASTOLIC BLOOD PRESSURE: 58 MMHG | HEIGHT: 66 IN | WEIGHT: 131.4 LBS

## 2019-12-17 DIAGNOSIS — Z23 NEED FOR VACCINATION: ICD-10-CM

## 2019-12-17 DIAGNOSIS — Z00.00 ANNUAL PHYSICAL EXAM: ICD-10-CM

## 2019-12-17 DIAGNOSIS — Z12.31 ENCOUNTER FOR SCREENING MAMMOGRAM FOR BREAST CANCER: ICD-10-CM

## 2019-12-17 DIAGNOSIS — Z01.818 PREOP TESTING: ICD-10-CM

## 2019-12-17 PROCEDURE — 90471 IMMUNIZATION ADMIN: CPT | Performed by: FAMILY MEDICINE

## 2019-12-17 PROCEDURE — 99213 OFFICE O/P EST LOW 20 MIN: CPT | Mod: 25 | Performed by: FAMILY MEDICINE

## 2019-12-17 PROCEDURE — 90750 HZV VACC RECOMBINANT IM: CPT | Performed by: FAMILY MEDICINE

## 2019-12-17 ASSESSMENT — PATIENT HEALTH QUESTIONNAIRE - PHQ9: CLINICAL INTERPRETATION OF PHQ2 SCORE: 0

## 2019-12-17 NOTE — PROGRESS NOTES
"Subjective:     CC: Preop    HPI:   Yesenia presents today with     Preop:  Patient here for preop cardiovascular exam however patient has no cardiovascular history.  She needs it simply for clearance for cosmetic surgery noninvasive.  No known cardiac defects, no exercise issues, no family history.    Past Medical History:   Diagnosis Date   • History of basal cell cancer 6/20/2018    S/P resection from nose 2015 H/O Moderate sun exposure FHx of melanoma       Social History     Tobacco Use   • Smoking status: Never Smoker   • Smokeless tobacco: Never Used   Substance Use Topics   • Alcohol use: Yes     Comment: once in a while    • Drug use: No       Current Outpatient Medications Ordered in Epic   Medication Sig Dispense Refill   • predniSONE (DELTASONE) 10 MG Tab 2 tablets daily x 7 days, 1 tablet daily x 7 days, 1/2 tablet daily x 7 days (Patient not taking: Reported on 12/17/2019) 30 Tab 0   • triamcinolone acetonide (KENALOG) 0.1 % Cream Apply 1 Application to affected area(s) 2 times a day. Until improved (Patient not taking: Reported on 12/17/2019) 30 g 1     No current [facility]-ordered facility-administered medications on file.        Allergies:  Patient has no known allergies.      ROS:  Gen: no fevers/chill, no changes in weight  Eyes: no changes in vision  ENT: no sore throat, no hearing loss, no bloody nose  Pulm: no sob, no cough  CV: no chest pain, no palpitations  GI: no nausea/vomiting, no diarrhea  : no dysuria  MSk: no myalgias  Skin: no rash  Neuro: no headaches, no numbness/tingling  Heme/Lymph: no easy bruising      Objective:       Exam:  /58 (BP Location: Left arm, Patient Position: Sitting)   Pulse 65   Temp 36.4 °C (97.6 °F) (Temporal)   Ht 1.664 m (5' 5.5\")   Wt 59.6 kg (131 lb 6.4 oz)   SpO2 95%   BMI 21.53 kg/m²  Body mass index is 21.53 kg/m².    Gen: Alert and oriented, No apparent distress.  Neck: Neck is supple without lymphadenopathy.  Lungs: Normal effort, CTA " bilaterally, no wheezes, rhonchi, or rales  CV: Regular rate and rhythm. No murmurs, rubs, or gallops.               Ext: No clubbing, cyanosis, edema.  EKG interpretation by me: NSR. HR is 60 . Axis is normal. No ST or QRS morphologic changes. No T-wave inversions. MI and QT intervals are normal. Quality of EKG is good.      Assessment & Plan:     59 y.o. female with the following -     1. Need for vaccination  Administered today  - Shingrix Vaccine    2. Encounter for screening mammogram for breast cancer  Ordered today  - MA-SCREEN MAMMO W/CAD-BILAT; Future    3. Annual physical exam  Follow-up annual  - HEMOGLOBIN A1C; Future  - CBC WITH DIFFERENTIAL; Future  - Comp Metabolic Panel; Future  - Lipid Profile; Future  - TSH WITH REFLEX TO FT4; Future  - HEP C VIRUS ANTIBODY; Future        Please note that this dictation was created using voice recognition software. I have made every reasonable attempt to correct obvious errors, but I expect that there are errors of grammar and possibly content that I did not discover before finalizing the note.

## 2020-02-05 ENCOUNTER — OFFICE VISIT (OUTPATIENT)
Dept: MEDICAL GROUP | Facility: LAB | Age: 60
End: 2020-02-05
Payer: COMMERCIAL

## 2020-02-05 VITALS
SYSTOLIC BLOOD PRESSURE: 112 MMHG | BODY MASS INDEX: 20.48 KG/M2 | HEIGHT: 66 IN | TEMPERATURE: 97.3 F | WEIGHT: 127.4 LBS | HEART RATE: 76 BPM | DIASTOLIC BLOOD PRESSURE: 62 MMHG | OXYGEN SATURATION: 97 %

## 2020-02-05 DIAGNOSIS — E78.49 OTHER HYPERLIPIDEMIA: ICD-10-CM

## 2020-02-05 DIAGNOSIS — Z12.12 SCREENING FOR COLORECTAL CANCER: ICD-10-CM

## 2020-02-05 DIAGNOSIS — Z00.00 WELL WOMAN EXAM WITHOUT GYNECOLOGICAL EXAM: ICD-10-CM

## 2020-02-05 DIAGNOSIS — Z12.11 SCREENING FOR COLORECTAL CANCER: ICD-10-CM

## 2020-02-05 PROCEDURE — 99396 PREV VISIT EST AGE 40-64: CPT | Performed by: FAMILY MEDICINE

## 2020-02-05 ASSESSMENT — PATIENT HEALTH QUESTIONNAIRE - PHQ9: CLINICAL INTERPRETATION OF PHQ2 SCORE: 0

## 2020-02-05 NOTE — PROGRESS NOTES
Subjective:     CC: No chief complaint on file.      HPI:   Yesenia Dey is a 60 y.o. female who presents for annual exam. She is feeling well and denies any complaints.    Patient has GYN provider: yes  Last pap: 2018, wnl  Last mammo: Due   Last colonoscopy: Due   Last bone density test: Age 66  Qualify for hep C screen: No   Last Tdap: Done  Gardiasil: Aged out  Hx. STD's: No  Birth control: PM    PM, asymptomatic   No significant bloating/fluid retention, pelvic pain, or dyspareunia. No vaginal discharge  No breast tenderness, mass, nipple discharge, changes in size or contour, or abnormal cyclic discomfort.  She does not perform regular self breast exams.  Regular exercise: yes   Diet: keto     She  has a past medical history of History of basal cell cancer (6/20/2018).  She  has a past surgical history that includes basal cell excision.    Family History   Problem Relation Age of Onset   • Other Mother         Primary billiary chirosis    • Cancer Father         AML   • Cancer Sister         Malignant Melanoma    • No Known Problems Brother        Social History     Socioeconomic History   • Marital status:      Spouse name: Not on file   • Number of children: Not on file   • Years of education: Not on file   • Highest education level: Not on file   Occupational History   • Not on file   Social Needs   • Financial resource strain: Not on file   • Food insecurity:     Worry: Not on file     Inability: Not on file   • Transportation needs:     Medical: Not on file     Non-medical: Not on file   Tobacco Use   • Smoking status: Never Smoker   • Smokeless tobacco: Never Used   Substance and Sexual Activity   • Alcohol use: Yes     Comment: once in a while    • Drug use: No   • Sexual activity: Yes     Partners: Male   Lifestyle   • Physical activity:     Days per week: Not on file     Minutes per session: Not on file   • Stress: Not on file   Relationships   • Social connections:     Talks on phone: Not  on file     Gets together: Not on file     Attends [employer] service: Not on file     Active member of club or organization: Not on file     Attends meetings of clubs or organizations: Not on file     Relationship status: Not on file   • Intimate partner violence:     Fear of current or ex partner: Not on file     Emotionally abused: Not on file     Physically abused: Not on file     Forced sexual activity: Not on file   Other Topics Concern   • Not on file   Social History Narrative   • Not on file       Patient Active Problem List    Diagnosis Date Noted   • History of basal cell cancer 06/20/2018   • Annual physical exam 06/20/2018         Current Outpatient Medications   Medication Sig Dispense Refill   • predniSONE (DELTASONE) 10 MG Tab 2 tablets daily x 7 days, 1 tablet daily x 7 days, 1/2 tablet daily x 7 days (Patient not taking: Reported on 12/17/2019) 30 Tab 0   • triamcinolone acetonide (KENALOG) 0.1 % Cream Apply 1 Application to affected area(s) 2 times a day. Until improved (Patient not taking: Reported on 12/17/2019) 30 g 1     No current facility-administered medications for this visit.      No Known Allergies    Review of Systems   Constitutional: Negative for fever, chills and malaise/fatigue.   HENT: Negative for congestion.    Eyes: Negative for pain.   Respiratory: Negative for cough and shortness of breath.    Cardiovascular: Negative for leg swelling.   Gastrointestinal: Negative for nausea, vomiting, abdominal pain and diarrhea.   Genitourinary: Negative for dysuria and hematuria.   Skin: Negative for rash.   Neurological: Negative for dizziness, focal weakness and headaches.   Endo/Heme/Allergies: Does not bruise/bleed easily.   Psychiatric/Behavioral: Negative for depression.  The patient is not nervous/anxious.      Objective:     There were no vitals taken for this visit.  There is no height or weight on file to calculate BMI.  Wt Readings from Last 4 Encounters:   12/17/19 59.6 kg (131  lb 6.4 oz)   06/27/18 61.7 kg (136 lb)   06/20/18 62.8 kg (138 lb 7.2 oz)   01/04/18 60.5 kg (133 lb 6.4 oz)       Physical Exam:  Constitutional: Well-developed and well-nourished. Not diaphoretic. No distress.   Skin: Skin is warm and dry. No rash noted.  Head: Atraumatic without lesions.  Eyes: Conjunctivae and extraocular motions are normal. Pupils are equal, round, and reactive to light. No scleral icterus.   Ears:  External ears unremarkable. Tympanic membranes clear and intact.  Nose: Nares patent. Septum midline. Turbinates without erythema nor edema. No discharge.   Mouth/Throat: Dentition is good. Tongue normal. Oropharynx is clear and moist. Posterior pharynx without erythema or exudates.  Neck: Supple, trachea midline. Normal range of motion. No thyromegaly present. No lymphadenopathy--cervical or supraclavicular.  Cardiovascular: Regular rate and rhythm, S1 and S2 without murmur, rubs, or gallops.    Abdomen: Soft, non tender, and without distention. Active bowel sounds in all four quadrants. No rebound, guarding, masses or HSM.  Extremities: No cyanosis, clubbing, erythema, nor edema. Distal pulses intact and symmetric.   Musculoskeletal: All major joints AROM full in all directions without pain.  Neurological: Alert and oriented x 3  Psychiatric:  Behavior, mood, and affect are appropriate.    Assessment and Plan:     1. Screening for colorectal cancer  Ordered today  - REFERRAL TO GI FOR COLONOSCOPY    2. Other hyperlipidemia  Low ASCVD risk.  Continue improvement on diet and exercise.    3. Well woman exam without gynecological exam  Up-to-date on preventive care, labs evaluated, normal physical exam      Sees eye doctor and dentist   Labs per orders  Immunizations per orders  Patient counseled about skin care, diet, supplements, vitamins, safe sex and exercise.    Follow-up: No follow-ups on file.    Please note that this dictation was created using voice recognition software. I have made every  reasonable attempt to correct obvious errors, but I expect that there are errors of grammar and possibly content that I did not discover before finalizing the note.

## 2020-10-19 ENCOUNTER — TELEPHONE (OUTPATIENT)
Dept: MEDICAL GROUP | Facility: LAB | Age: 60
End: 2020-10-19

## 2020-10-19 NOTE — TELEPHONE ENCOUNTER
1. Caller Name: Yesenia Dey                     Call Back Number: [phone] (home)     How would the patient prefer to be contacted with a response: Phone call OK to leave a detailed message    possible exposed also wanting the antibody test  COVID/SARS COV-2 PCR       1. IS THE PATIENT BEING ADMITTED?                                                                       YES OR NO    2.  IS THIS TEST FOR DIAGNOSIS OR SCREENING                                                                          3.IS THIS THE PATIENTS FIRST SARS COV-2 TEST?                                                                    YES   4.IS THE PATIENT EMPLOYED IN HEALTHCARE?                                                                     NO    5.IS THE PATIENT SYMPTOMATIC AS DEFINED BY THE CDC?                                                                     NO    6.IS THE PATIENT HOSPITALIZED?                                                                     NO    7.IS THE PATIENT A RESIDENT IN A CONGREGATE CARE SETTING?                                                                     NO    8.IS THE PATIENT PREGNANT?                                                                      NO

## 2020-10-20 NOTE — TELEPHONE ENCOUNTER
Spoke with pt she was exposed through her . His test came back negative. She will decline this test as for now since she does not have any symptoms.

## 2020-11-23 ENCOUNTER — TELEPHONE (OUTPATIENT)
Dept: MEDICAL GROUP | Facility: LAB | Age: 60
End: 2020-11-23

## 2020-11-23 DIAGNOSIS — Z20.822 EXPOSURE TO COVID-19 VIRUS: ICD-10-CM

## 2020-11-23 NOTE — TELEPHONE ENCOUNTER
1. IS THE PATIENT BEING ADMITTED?                                                                       NO    2.  IS THIS TEST FOR DIAGNOSIS OR SCREENING   SCREEN    3.IS THIS THE PATIENTS FIRST SARS COV-2 TEST?                                                                    YES   4.IS THE PATIENT EMPLOYED IN HEALTHCARE?                                                                     NO    5.IS THE PATIENT SYMPTOMATIC AS DEFINED BY THE CDC?                                                                    NO- tomorrow will be day 5  Of expose from a positive coworker     6.IS THE PATIENT HOSPITALIZED?                                                                    YES OR NO  7.IS THE PATIENT A RESIDENT IN A CONGREGATE CARE SETTING?                                                                     NO    8.IS THE PATIENT PREGNANT?                                                                       NO

## 2020-11-23 NOTE — TELEPHONE ENCOUNTER
----- Message from Yesenia Dey sent at 11/23/2020  8:41 AM PST -----  Regarding: Prescription Question  Contact: [phone]  I have had an exposure to someone who tested positive to Covid 19.  Can I get a prescription for a covid test for me?  I had a prescription from you a few weeks ago, but ended up not needing or using it.  Is that one usable?  Also, can I get the prescription electronically?

## 2020-11-24 ENCOUNTER — HOSPITAL ENCOUNTER (OUTPATIENT)
Dept: LAB | Facility: MEDICAL CENTER | Age: 60
End: 2020-11-24
Attending: PHYSICIAN ASSISTANT
Payer: COMMERCIAL

## 2020-11-24 DIAGNOSIS — Z20.822 EXPOSURE TO COVID-19 VIRUS: ICD-10-CM

## 2020-11-24 LAB — COVID ORDER STATUS COVID19: NORMAL

## 2020-11-24 PROCEDURE — U0003 INFECTIOUS AGENT DETECTION BY NUCLEIC ACID (DNA OR RNA); SEVERE ACUTE RESPIRATORY SYNDROME CORONAVIRUS 2 (SARS-COV-2) (CORONAVIRUS DISEASE [COVID-19]), AMPLIFIED PROBE TECHNIQUE, MAKING USE OF HIGH THROUGHPUT TECHNOLOGIES AS DESCRIBED BY CMS-2020-01-R: HCPCS

## 2020-11-24 PROCEDURE — C9803 HOPD COVID-19 SPEC COLLECT: HCPCS

## 2020-11-25 LAB
SARS-COV-2 RNA RESP QL NAA+PROBE: NOTDETECTED
SPECIMEN SOURCE: NORMAL

## 2021-02-09 ENCOUNTER — APPOINTMENT (OUTPATIENT)
Dept: MEDICAL GROUP | Facility: LAB | Age: 61
End: 2021-02-09
Payer: COMMERCIAL

## 2021-04-02 ENCOUNTER — OFFICE VISIT (OUTPATIENT)
Dept: MEDICAL GROUP | Facility: LAB | Age: 61
End: 2021-04-02
Payer: COMMERCIAL

## 2021-04-02 VITALS
HEART RATE: 69 BPM | WEIGHT: 129 LBS | OXYGEN SATURATION: 100 % | SYSTOLIC BLOOD PRESSURE: 90 MMHG | BODY MASS INDEX: 21.49 KG/M2 | DIASTOLIC BLOOD PRESSURE: 60 MMHG | HEIGHT: 65 IN | TEMPERATURE: 97 F

## 2021-04-02 DIAGNOSIS — Z76.89 ENCOUNTER TO ESTABLISH CARE WITH NEW DOCTOR: ICD-10-CM

## 2021-04-02 DIAGNOSIS — Z12.31 ENCOUNTER FOR SCREENING MAMMOGRAM FOR MALIGNANT NEOPLASM OF BREAST: ICD-10-CM

## 2021-04-02 DIAGNOSIS — Z00.00 HEALTH CARE MAINTENANCE: ICD-10-CM

## 2021-04-02 PROCEDURE — 99213 OFFICE O/P EST LOW 20 MIN: CPT | Performed by: FAMILY MEDICINE

## 2021-04-02 ASSESSMENT — PATIENT HEALTH QUESTIONNAIRE - PHQ9: CLINICAL INTERPRETATION OF PHQ2 SCORE: 0

## 2021-04-02 NOTE — PROGRESS NOTES
CC: Here to establish care, no specific complaints or concerns today.    HPI: Established patient, new to me  Yesenia presents today to establish care, 61 years old female with past medical history significant for hyperlipidemia, discussed the following concerns as follow today:      1. Encounter to establish care with new doctor  Reviewed past medical problems today, past surgical history, family/social history and records, most recent lab work results reviewed today, done in February 2020.  Patient is a homemaker, lives with her .  Has 3 grownup children.  Goes back and forth between [place] and [place].  2. Health care maintenance  Reviewed health maintenance topic patient needs referral for her colonoscopy she is due for colon cancer screening.  She will schedule an appointment with me for well woman exam, will place referral today for mammogram.  Updated health maintenance topic today.      Patient Active Problem List    Diagnosis Date Noted   • Encounter to establish care with new doctor 04/02/2021   • Health care maintenance 04/02/2021   • Other hyperlipidemia 02/05/2020   • History of basal cell cancer 06/20/2018   • Annual physical exam 06/20/2018       No current outpatient medications on file.     No current facility-administered medications for this visit.         Allergies as of 04/02/2021   • (No Known Allergies)        ROS: Denies any chest pain, Shortness of breath, Changes bowel or bladder, Lower extremity edema.    Physical Exam:  Gen.: Well-developed, well-nourished, no apparent distress,pleasant and cooperative with the examination  Skin:  Warm and dry with good turgor. No rashes or suspicious lesions in visible areas  Eye: PERRLA, conjunctiva and sclera clear, lids normal  HEENT: Normocephalic/atraumatic, sinuses nontender with palpation, TMs clear, nares patent with pink mucosa and clear rhinorrhea, lips without lesions, oropharynx clear.  Neck: Trachea midline,no masses or  adenopathy  Thyroid: normal consistency and size. No masses or nodules. Not tender with palpation.  Cor: Regular rate and rhythm without murmur, gallop or rub.  Lungs: Respirations unlabored.Clear to auscultation with equal breath sounds bilaterally. No wheezes, rhonchi.  Abdomen: Soft nontender without hepatosplenomegaly or masses appreciated, normoactive bowel sounds. No hernias.  Extremities: No cyanosis, clubbing or edema, Symmetrical without deformities or malformations. Pulses 2+ and symmetrical both upper and lower extremities  Lymphatic: No abnormal adenopathy of the neck groin or axillae.  Psych: Alert and oriented x 3.Normal affect, judgement,insight and memory.        Assessment and Plan.   61 y.o. female here to establish care    1. Encounter to establish care with new doctor  Reviewed medical history today, advised to do her annual labs.  - REFERRAL TO DERMATOLOGY  - REFERRAL TO GASTROENTEROLOGY  - CBC WITH DIFFERENTIAL; Future  - Comp Metabolic Panel; Future  - Lipid Profile; Future  - TSH WITH REFLEX TO FT4; Future    2. Health care maintenance  Patient due for colon cancer screening, she will schedule an appointment for well woman exam.  Order for mammogram placed today.     - REFERRAL TO DERMATOLOGY  - REFERRAL TO GASTROENTEROLOGY  Please note that this dictation was created using voice recognition software. I have made every reasonable attempt to correct obvious errors but there may be errors of grammar and content that I may have overlooked prior to finalization of this note.

## 2021-05-04 ENCOUNTER — APPOINTMENT (OUTPATIENT)
Dept: MEDICAL GROUP | Facility: LAB | Age: 61
End: 2021-05-04
Payer: COMMERCIAL

## 2024-06-25 ENCOUNTER — DOCUMENTATION (OUTPATIENT)
Dept: HEALTH INFORMATION MANAGEMENT | Facility: OTHER | Age: 64
End: 2024-06-25

## 2024-08-01 ENCOUNTER — TELEPHONE (OUTPATIENT)
Dept: HEALTH INFORMATION MANAGEMENT | Facility: OTHER | Age: 64
End: 2024-08-01